# Patient Record
Sex: FEMALE | Race: BLACK OR AFRICAN AMERICAN | NOT HISPANIC OR LATINO | Employment: FULL TIME | URBAN - METROPOLITAN AREA
[De-identification: names, ages, dates, MRNs, and addresses within clinical notes are randomized per-mention and may not be internally consistent; named-entity substitution may affect disease eponyms.]

---

## 2018-12-03 ENCOUNTER — APPOINTMENT (OUTPATIENT)
Dept: RADIOLOGY | Facility: MEDICAL CENTER | Age: 38
End: 2018-12-03
Attending: EMERGENCY MEDICINE
Payer: MEDICAID

## 2018-12-03 ENCOUNTER — HOSPITAL ENCOUNTER (EMERGENCY)
Facility: MEDICAL CENTER | Age: 38
End: 2018-12-03
Attending: EMERGENCY MEDICINE
Payer: MEDICAID

## 2018-12-03 VITALS
BODY MASS INDEX: 33.3 KG/M2 | TEMPERATURE: 98.6 F | WEIGHT: 176.37 LBS | RESPIRATION RATE: 20 BRPM | DIASTOLIC BLOOD PRESSURE: 94 MMHG | SYSTOLIC BLOOD PRESSURE: 137 MMHG | OXYGEN SATURATION: 98 % | HEIGHT: 61 IN | HEART RATE: 85 BPM

## 2018-12-03 DIAGNOSIS — R07.9 ACUTE CHEST PAIN: ICD-10-CM

## 2018-12-03 DIAGNOSIS — S22.000A COMPRESSION FRACTURE OF BODY OF THORACIC VERTEBRA (HCC): ICD-10-CM

## 2018-12-03 DIAGNOSIS — R05.9 COUGH: ICD-10-CM

## 2018-12-03 DIAGNOSIS — M79.89 LEFT LEG SWELLING: ICD-10-CM

## 2018-12-03 LAB
ALBUMIN SERPL BCP-MCNC: 3.8 G/DL (ref 3.2–4.9)
ALBUMIN/GLOB SERPL: 1.2 G/DL
ALP SERPL-CCNC: 73 U/L (ref 30–99)
ALT SERPL-CCNC: 22 U/L (ref 2–50)
ANION GAP SERPL CALC-SCNC: 7 MMOL/L (ref 0–11.9)
AST SERPL-CCNC: 29 U/L (ref 12–45)
BASOPHILS # BLD AUTO: 0.5 % (ref 0–1.8)
BASOPHILS # BLD: 0.03 K/UL (ref 0–0.12)
BILIRUB SERPL-MCNC: 0.6 MG/DL (ref 0.1–1.5)
BUN SERPL-MCNC: <5 MG/DL (ref 8–22)
CALCIUM SERPL-MCNC: 9.3 MG/DL (ref 8.4–10.2)
CHLORIDE SERPL-SCNC: 101 MMOL/L (ref 96–112)
CO2 SERPL-SCNC: 24 MMOL/L (ref 20–33)
CREAT SERPL-MCNC: 0.91 MG/DL (ref 0.5–1.4)
EOSINOPHIL # BLD AUTO: 0.1 K/UL (ref 0–0.51)
EOSINOPHIL NFR BLD: 1.7 % (ref 0–6.9)
ERYTHROCYTE [DISTWIDTH] IN BLOOD BY AUTOMATED COUNT: 41.5 FL (ref 35.9–50)
GLOBULIN SER CALC-MCNC: 3.3 G/DL (ref 1.9–3.5)
GLUCOSE SERPL-MCNC: 106 MG/DL (ref 65–99)
HCG SERPL QL: NEGATIVE
HCT VFR BLD AUTO: 45.7 % (ref 37–47)
HGB BLD-MCNC: 16.1 G/DL (ref 12–16)
IMM GRANULOCYTES # BLD AUTO: 0.01 K/UL (ref 0–0.11)
IMM GRANULOCYTES NFR BLD AUTO: 0.2 % (ref 0–0.9)
LYMPHOCYTES # BLD AUTO: 2.22 K/UL (ref 1–4.8)
LYMPHOCYTES NFR BLD: 38.2 % (ref 22–41)
MCH RBC QN AUTO: 30.3 PG (ref 27–33)
MCHC RBC AUTO-ENTMCNC: 35.2 G/DL (ref 33.6–35)
MCV RBC AUTO: 85.9 FL (ref 81.4–97.8)
MONOCYTES # BLD AUTO: 1 K/UL (ref 0–0.85)
MONOCYTES NFR BLD AUTO: 17.2 % (ref 0–13.4)
NEUTROPHILS # BLD AUTO: 2.45 K/UL (ref 2–7.15)
NEUTROPHILS NFR BLD: 42.2 % (ref 44–72)
NRBC # BLD AUTO: 0 K/UL
NRBC BLD-RTO: 0 /100 WBC
PLATELET # BLD AUTO: 289 K/UL (ref 164–446)
PMV BLD AUTO: 9.7 FL (ref 9–12.9)
POTASSIUM SERPL-SCNC: 3.5 MMOL/L (ref 3.6–5.5)
PROT SERPL-MCNC: 7.1 G/DL (ref 6–8.2)
RBC # BLD AUTO: 5.32 M/UL (ref 4.2–5.4)
SODIUM SERPL-SCNC: 132 MMOL/L (ref 135–145)
TROPONIN I SERPL-MCNC: <0.02 NG/ML (ref 0–0.04)
WBC # BLD AUTO: 5.8 K/UL (ref 4.8–10.8)

## 2018-12-03 PROCEDURE — 71046 X-RAY EXAM CHEST 2 VIEWS: CPT

## 2018-12-03 PROCEDURE — 85025 COMPLETE CBC W/AUTO DIFF WBC: CPT

## 2018-12-03 PROCEDURE — 80053 COMPREHEN METABOLIC PANEL: CPT

## 2018-12-03 PROCEDURE — 700111 HCHG RX REV CODE 636 W/ 250 OVERRIDE (IP): Performed by: EMERGENCY MEDICINE

## 2018-12-03 PROCEDURE — 96374 THER/PROPH/DIAG INJ IV PUSH: CPT

## 2018-12-03 PROCEDURE — 99285 EMERGENCY DEPT VISIT HI MDM: CPT

## 2018-12-03 PROCEDURE — 93970 EXTREMITY STUDY: CPT

## 2018-12-03 PROCEDURE — 84484 ASSAY OF TROPONIN QUANT: CPT

## 2018-12-03 PROCEDURE — A9270 NON-COVERED ITEM OR SERVICE: HCPCS | Performed by: EMERGENCY MEDICINE

## 2018-12-03 PROCEDURE — 700102 HCHG RX REV CODE 250 W/ 637 OVERRIDE(OP): Performed by: EMERGENCY MEDICINE

## 2018-12-03 PROCEDURE — 84703 CHORIONIC GONADOTROPIN ASSAY: CPT

## 2018-12-03 PROCEDURE — 93005 ELECTROCARDIOGRAM TRACING: CPT

## 2018-12-03 PROCEDURE — 93005 ELECTROCARDIOGRAM TRACING: CPT | Performed by: EMERGENCY MEDICINE

## 2018-12-03 RX ORDER — BENZONATATE 100 MG/1
100 CAPSULE ORAL 3 TIMES DAILY PRN
Qty: 30 CAP | Refills: 0 | Status: SHIPPED | OUTPATIENT
Start: 2018-12-03 | End: 2018-12-13

## 2018-12-03 RX ORDER — KETOROLAC TROMETHAMINE 30 MG/ML
15 INJECTION, SOLUTION INTRAMUSCULAR; INTRAVENOUS ONCE
Status: COMPLETED | OUTPATIENT
Start: 2018-12-03 | End: 2018-12-03

## 2018-12-03 RX ORDER — BENZONATATE 100 MG/1
100 CAPSULE ORAL ONCE
Status: COMPLETED | OUTPATIENT
Start: 2018-12-03 | End: 2018-12-03

## 2018-12-03 RX ADMIN — BENZONATATE 100 MG: 100 CAPSULE ORAL at 04:33

## 2018-12-03 RX ADMIN — KETOROLAC TROMETHAMINE 15 MG: 30 INJECTION, SOLUTION INTRAMUSCULAR at 04:33

## 2018-12-03 ASSESSMENT — ENCOUNTER SYMPTOMS
BRUISES/BLEEDS EASILY: 0
BLOOD IN STOOL: 0
SEIZURES: 0
ABDOMINAL PAIN: 0
RESPIRATORY NEGATIVE: 1
HALLUCINATIONS: 0
FLANK PAIN: 0
GASTROINTESTINAL NEGATIVE: 1
CONSTITUTIONAL NEGATIVE: 1
WEAKNESS: 0
SHORTNESS OF BREATH: 0
FOCAL WEAKNESS: 0
FEVER: 0
BACK PAIN: 0
MYALGIAS: 0
EYE PAIN: 0
HEADACHES: 0
EYES NEGATIVE: 1
SORE THROAT: 0
NECK PAIN: 0

## 2018-12-03 NOTE — DISCHARGE INSTRUCTIONS
Today your xray shows: a compression fracture of T8 vertebral body.  Please discuss this with your primary care physician for ongoing management

## 2018-12-03 NOTE — ED NOTES
D/c inst reviewed w/the pt to include supportive tx for cold sx, rx x 1, f/u op and return as needed.
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
## 2018-12-17 ENCOUNTER — OFFICE VISIT (OUTPATIENT)
Dept: MEDICAL GROUP | Facility: MEDICAL CENTER | Age: 38
End: 2018-12-17
Attending: FAMILY MEDICINE
Payer: MEDICAID

## 2018-12-17 VITALS
HEIGHT: 61 IN | WEIGHT: 172 LBS | RESPIRATION RATE: 14 BRPM | TEMPERATURE: 97.8 F | HEART RATE: 98 BPM | OXYGEN SATURATION: 96 % | DIASTOLIC BLOOD PRESSURE: 80 MMHG | SYSTOLIC BLOOD PRESSURE: 115 MMHG | BODY MASS INDEX: 32.47 KG/M2

## 2018-12-17 DIAGNOSIS — F17.200 SMOKER: ICD-10-CM

## 2018-12-17 DIAGNOSIS — M54.6 MIDLINE THORACIC BACK PAIN, UNSPECIFIED CHRONICITY: ICD-10-CM

## 2018-12-17 DIAGNOSIS — S22.000A CLOSED COMPRESSION FRACTURE OF THORACIC VERTEBRA, INITIAL ENCOUNTER (HCC): ICD-10-CM

## 2018-12-17 DIAGNOSIS — F19.11 HISTORY OF SUBSTANCE ABUSE (HCC): ICD-10-CM

## 2018-12-17 DIAGNOSIS — J45.20 MILD INTERMITTENT ASTHMA WITHOUT COMPLICATION: ICD-10-CM

## 2018-12-17 DIAGNOSIS — F31.9 BIPOLAR 1 DISORDER (HCC): ICD-10-CM

## 2018-12-17 PROCEDURE — 99213 OFFICE O/P EST LOW 20 MIN: CPT | Performed by: FAMILY MEDICINE

## 2018-12-17 PROCEDURE — 99214 OFFICE O/P EST MOD 30 MIN: CPT | Performed by: FAMILY MEDICINE

## 2018-12-17 RX ORDER — NICOTINE 21 MG/24HR
1 PATCH, TRANSDERMAL 24 HOURS TRANSDERMAL EVERY 24 HOURS
Qty: 30 PATCH | Refills: 3 | Status: SHIPPED | OUTPATIENT
Start: 2018-12-17 | End: 2020-12-09

## 2018-12-17 RX ORDER — MELOXICAM 15 MG/1
7.5-15 TABLET ORAL DAILY
Qty: 30 TAB | Refills: 3 | Status: SHIPPED | OUTPATIENT
Start: 2018-12-17 | End: 2020-12-09

## 2018-12-17 RX ORDER — ALBUTEROL SULFATE 90 UG/1
2 AEROSOL, METERED RESPIRATORY (INHALATION) EVERY 4 HOURS PRN
Qty: 1 INHALER | Refills: 3 | Status: SHIPPED | OUTPATIENT
Start: 2018-12-17 | End: 2019-05-01 | Stop reason: SDUPTHER

## 2018-12-17 RX ORDER — GABAPENTIN 100 MG/1
100 CAPSULE ORAL 3 TIMES DAILY
Qty: 90 CAP | Refills: 3 | Status: SHIPPED | OUTPATIENT
Start: 2018-12-17 | End: 2019-01-21

## 2018-12-17 ASSESSMENT — ENCOUNTER SYMPTOMS
TINGLING: 1
NECK PAIN: 0
HEADACHES: 0
ABDOMINAL PAIN: 0
PALPITATIONS: 0
NERVOUS/ANXIOUS: 1
EYES NEGATIVE: 1
VOMITING: 0
SPEECH CHANGE: 0
HALLUCINATIONS: 0
CHILLS: 0
NAUSEA: 0
COUGH: 0
TREMORS: 0
FOCAL WEAKNESS: 1
FEVER: 0
SEIZURES: 0
BACK PAIN: 1
SENSORY CHANGE: 0
SHORTNESS OF BREATH: 0
DEPRESSION: 1
INSOMNIA: 0
SPUTUM PRODUCTION: 0

## 2018-12-17 ASSESSMENT — LIFESTYLE VARIABLES: SUBSTANCE_ABUSE: 1

## 2018-12-24 ENCOUNTER — TELEPHONE (OUTPATIENT)
Dept: MEDICAL GROUP | Facility: MEDICAL CENTER | Age: 38
End: 2018-12-24

## 2018-12-24 NOTE — TELEPHONE ENCOUNTER
She will need to see neurosurgery as ordered. Minimal disc bulge in the area of the compression fracture. Will continue to follow. ER precaution had been given to pt.    Thanks

## 2018-12-24 NOTE — TELEPHONE ENCOUNTER
1. Caller Name: Julia Choi                                           Call Back Number: [phone] (home)         Patient approves a detailed voicemail message: N\A    Patient is requesting results from mri. Please advise.

## 2019-01-16 ENCOUNTER — APPOINTMENT (OUTPATIENT)
Dept: PHYSICAL THERAPY | Facility: REHABILITATION | Age: 39
End: 2019-01-16
Payer: MEDICAID

## 2019-01-21 ENCOUNTER — OFFICE VISIT (OUTPATIENT)
Dept: MEDICAL GROUP | Facility: MEDICAL CENTER | Age: 39
End: 2019-01-21
Attending: FAMILY MEDICINE
Payer: MEDICAID

## 2019-01-21 VITALS
BODY MASS INDEX: 32.1 KG/M2 | WEIGHT: 170 LBS | SYSTOLIC BLOOD PRESSURE: 115 MMHG | RESPIRATION RATE: 14 BRPM | DIASTOLIC BLOOD PRESSURE: 70 MMHG | HEIGHT: 61 IN | OXYGEN SATURATION: 96 % | HEART RATE: 84 BPM | TEMPERATURE: 97.3 F

## 2019-01-21 DIAGNOSIS — G89.4 CHRONIC PAIN SYNDROME: ICD-10-CM

## 2019-01-21 DIAGNOSIS — S22.000G CLOSED COMPRESSION FRACTURE OF THORACIC VERTEBRA WITH DELAYED HEALING, SUBSEQUENT ENCOUNTER: ICD-10-CM

## 2019-01-21 PROCEDURE — 99214 OFFICE O/P EST MOD 30 MIN: CPT | Performed by: FAMILY MEDICINE

## 2019-01-21 PROCEDURE — 99212 OFFICE O/P EST SF 10 MIN: CPT | Performed by: FAMILY MEDICINE

## 2019-01-21 RX ORDER — GABAPENTIN 300 MG/1
300 CAPSULE ORAL 3 TIMES DAILY
Qty: 90 CAP | Refills: 3 | Status: SHIPPED | OUTPATIENT
Start: 2019-01-21 | End: 2019-05-01 | Stop reason: SDUPTHER

## 2019-01-21 RX ORDER — TIZANIDINE 4 MG/1
4 TABLET ORAL EVERY 6 HOURS PRN
Qty: 30 TAB | Refills: 3 | Status: SHIPPED | OUTPATIENT
Start: 2019-01-21 | End: 2019-01-21 | Stop reason: SDUPTHER

## 2019-01-21 RX ORDER — TIZANIDINE 4 MG/1
2-4 TABLET ORAL EVERY 6 HOURS PRN
Qty: 30 TAB | Refills: 3 | Status: SHIPPED | OUTPATIENT
Start: 2019-01-21 | End: 2020-12-09

## 2019-01-21 ASSESSMENT — ENCOUNTER SYMPTOMS
SPEECH CHANGE: 0
BACK PAIN: 1
ABDOMINAL PAIN: 0
HALLUCINATIONS: 0
FEVER: 0
SENSORY CHANGE: 0
VOMITING: 0
NAUSEA: 0
CHILLS: 0
NECK PAIN: 1
HEADACHES: 0
SHORTNESS OF BREATH: 0
PALPITATIONS: 0
DEPRESSION: 0
FOCAL WEAKNESS: 0
NERVOUS/ANXIOUS: 1
TREMORS: 0
TINGLING: 1
SPUTUM PRODUCTION: 0
COUGH: 0

## 2019-01-21 ASSESSMENT — LIFESTYLE VARIABLES: SUBSTANCE_ABUSE: 0

## 2019-01-21 NOTE — PROGRESS NOTES
Subjective:      Julia Choi is a 38 y.o. female who presents with Follow-Up (referrals)            Patient 38-year-old female here for follow-up of her chronic thoracic pain.    She states that she forgot to get her x-ray exams on her MRI done.  She will be getting it done prior to her next appointment.  The order forms have been reprinted for patient to have copies of the test she needs to have done.  She is also waiting for the neurosurgeon and pain specialist to call her back.  She states that she has tried to call their office but did not get a response when she tried to call.  We will reprint the information to the specialist she should be contacting.  We will continue to follow.  Her Neurontin has been increased from 100 mg 3 times daily to 300 mg 3 times daily.  We will have her continue using her Mobic as directed.  We will also have her try Zanaflex 2-4 mg as needed.  And we will also have her contact physical therapy to start therapy for her chronic back pain.  We will continue to follow.     Current medications, allergies, and problem list reviewed with patient and updated in EPIC.          Review of Systems   Constitutional: Negative for chills and fever.   HENT: Negative for hearing loss and tinnitus.    Respiratory: Negative for cough, sputum production and shortness of breath.    Cardiovascular: Negative for chest pain and palpitations.   Gastrointestinal: Negative for abdominal pain, nausea and vomiting.   Musculoskeletal: Positive for back pain and neck pain. Negative for joint pain.   Skin: Negative for rash.   Neurological: Positive for tingling. Negative for tremors, sensory change, speech change, focal weakness and headaches.   Psychiatric/Behavioral: Negative for depression, hallucinations, substance abuse and suicidal ideas. The patient is nervous/anxious.           Objective:     /70 (BP Location: Right arm, Patient Position: Sitting)   Pulse 84   Temp 36.3 °C (97.3 °F)    "Resp 14   Ht 1.549 m (5' 1\")   Wt 77.1 kg (170 lb)   SpO2 96%   BMI 32.12 kg/m²      Physical Exam   Constitutional: She is oriented to person, place, and time.   BMI 32   HENT:   Head: Normocephalic and atraumatic.   Cardiovascular: Normal rate, regular rhythm and normal heart sounds.  Exam reveals no friction rub.    No murmur heard.  Pulmonary/Chest: Effort normal and breath sounds normal. No respiratory distress. She has no wheezes. She has no rales.   Abdominal: Soft. Bowel sounds are normal. She exhibits no distension. There is no tenderness.   Musculoskeletal: She exhibits tenderness. She exhibits no edema.   Over thoracic spine   Neurological: She is alert and oriented to person, place, and time.   Skin: Skin is warm and dry.   Psychiatric: She has a normal mood and affect. Her behavior is normal.   Nursing note and vitals reviewed.              Assessment/Plan:     1. Closed compression fracture of thoracic vertebra with delayed healing, subsequent encounter  We will have her get her x-rays done of her back.  She will also contact neurosurgery and pain management to schedule appointments for further evaluation.  In the meantime will have her increase her Neurontin to 300 mg 3 times daily, will have her continue Mobic as directed and also try Zanaflex 2-4 mg 3 times a day as needed.  Physical therapy has also been ordered for patient.  We will continue to follow.  - tizanidine (ZANAFLEX) 2-4 MG Tab; Take 0.5-1 Tabs by mouth every 6 hours as needed.  Dispense: 30 Tab; Refill: 3    2. Chronic pain syndrome  See above plan.  - tizanidine (ZANAFLEX) 2-4 MG Tab; Take 0.5-1 Tabs by mouth every 6 hours as needed.  Dispense: 30 Tab; Refill: 3        "

## 2019-01-22 ENCOUNTER — APPOINTMENT (OUTPATIENT)
Dept: PHYSICAL THERAPY | Facility: REHABILITATION | Age: 39
End: 2019-01-22
Payer: MEDICAID

## 2019-01-25 ENCOUNTER — APPOINTMENT (OUTPATIENT)
Dept: PHYSICAL THERAPY | Facility: REHABILITATION | Age: 39
End: 2019-01-25
Payer: MEDICAID

## 2019-01-29 ENCOUNTER — APPOINTMENT (OUTPATIENT)
Dept: PHYSICAL THERAPY | Facility: REHABILITATION | Age: 39
End: 2019-01-29
Payer: MEDICAID

## 2019-01-31 LAB — EKG IMPRESSION: NORMAL

## 2019-02-01 ENCOUNTER — APPOINTMENT (OUTPATIENT)
Dept: PHYSICAL THERAPY | Facility: REHABILITATION | Age: 39
End: 2019-02-01
Payer: MEDICAID

## 2019-05-01 RX ORDER — ALBUTEROL SULFATE 90 UG/1
2 AEROSOL, METERED RESPIRATORY (INHALATION) EVERY 4 HOURS PRN
Qty: 1 INHALER | Refills: 0 | Status: SHIPPED | OUTPATIENT
Start: 2019-05-01 | End: 2019-05-24 | Stop reason: SDUPTHER

## 2019-05-01 RX ORDER — GABAPENTIN 300 MG/1
300 CAPSULE ORAL 3 TIMES DAILY
Qty: 90 CAP | Refills: 0 | Status: SHIPPED | OUTPATIENT
Start: 2019-05-01 | End: 2020-12-09

## 2019-05-24 RX ORDER — ALBUTEROL SULFATE 90 UG/1
2 AEROSOL, METERED RESPIRATORY (INHALATION) EVERY 4 HOURS PRN
Qty: 8.5 INHALER | Refills: 1 | Status: SHIPPED | OUTPATIENT
Start: 2019-05-24 | End: 2019-07-30 | Stop reason: SDUPTHER

## 2019-07-30 RX ORDER — ALBUTEROL SULFATE 90 UG/1
2 AEROSOL, METERED RESPIRATORY (INHALATION) EVERY 4 HOURS PRN
Qty: 8.5 INHALER | Refills: 1 | Status: SHIPPED | OUTPATIENT
Start: 2019-07-30 | End: 2020-12-09

## 2019-09-09 ENCOUNTER — HOSPITAL ENCOUNTER (EMERGENCY)
Facility: MEDICAL CENTER | Age: 39
End: 2019-09-10
Attending: EMERGENCY MEDICINE
Payer: MEDICAID

## 2019-09-09 VITALS
HEART RATE: 97 BPM | HEIGHT: 61 IN | SYSTOLIC BLOOD PRESSURE: 117 MMHG | RESPIRATION RATE: 19 BRPM | DIASTOLIC BLOOD PRESSURE: 75 MMHG | OXYGEN SATURATION: 96 % | TEMPERATURE: 98.1 F | WEIGHT: 175.27 LBS | BODY MASS INDEX: 33.09 KG/M2

## 2019-09-09 DIAGNOSIS — Z20.2 EXPOSURE TO SYPHILIS: ICD-10-CM

## 2019-09-09 PROCEDURE — 99284 EMERGENCY DEPT VISIT MOD MDM: CPT

## 2019-09-09 PROCEDURE — 96372 THER/PROPH/DIAG INJ SC/IM: CPT

## 2019-09-09 PROCEDURE — 86780 TREPONEMA PALLIDUM: CPT

## 2019-09-09 PROCEDURE — 700111 HCHG RX REV CODE 636 W/ 250 OVERRIDE (IP): Performed by: EMERGENCY MEDICINE

## 2019-09-09 RX ADMIN — PENICILLIN G BENZATHINE 2.4 MILLION UNITS: 1200000 INJECTION, SUSPENSION INTRAMUSCULAR at 23:51

## 2019-09-09 ASSESSMENT — ENCOUNTER SYMPTOMS
FLANK PAIN: 0
SHORTNESS OF BREATH: 0
FEVER: 0
COUGH: 0
NECK PAIN: 0
CHILLS: 0

## 2019-09-10 LAB — TREPONEMA PALLIDUM IGG+IGM AB [PRESENCE] IN SERUM OR PLASMA BY IMMUNOASSAY: NON REACTIVE
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
## 2019-09-10 NOTE — ED NOTES
PT tolerated IM injection poorly. PT given DC instructions and states understanding. Denies need for assistance. Stable at DC. NAD noted. Ambulatory with ease.

## 2019-09-10 NOTE — ED NOTES
Pt informed of estimated wait times and made aware no wait time is guaranteed. Educated on triage and care process.  Asked to return to RN for any new or worsening of symptoms. Thanked for patience. Pt aware of risks of leaving ama. Pelvic cart set up and in room
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
## 2020-12-09 ENCOUNTER — OFFICE VISIT (OUTPATIENT)
Dept: MEDICAL GROUP | Facility: MEDICAL CENTER | Age: 40
End: 2020-12-09
Attending: FAMILY MEDICINE
Payer: COMMERCIAL

## 2020-12-09 ENCOUNTER — TELEPHONE (OUTPATIENT)
Dept: SCHEDULING | Facility: IMAGING CENTER | Age: 40
End: 2020-12-09

## 2020-12-09 VITALS
HEIGHT: 61 IN | DIASTOLIC BLOOD PRESSURE: 80 MMHG | TEMPERATURE: 97.8 F | OXYGEN SATURATION: 98 % | HEART RATE: 83 BPM | RESPIRATION RATE: 16 BRPM | SYSTOLIC BLOOD PRESSURE: 118 MMHG | BODY MASS INDEX: 33.61 KG/M2 | WEIGHT: 178 LBS

## 2020-12-09 DIAGNOSIS — Z12.31 ENCOUNTER FOR SCREENING MAMMOGRAM FOR MALIGNANT NEOPLASM OF BREAST: ICD-10-CM

## 2020-12-09 DIAGNOSIS — F31.9 BIPOLAR 1 DISORDER (HCC): ICD-10-CM

## 2020-12-09 DIAGNOSIS — Z00.00 HEALTHCARE MAINTENANCE: ICD-10-CM

## 2020-12-09 DIAGNOSIS — M25.552 LEFT HIP PAIN: ICD-10-CM

## 2020-12-09 DIAGNOSIS — Z97.5 IUD (INTRAUTERINE DEVICE) IN PLACE: ICD-10-CM

## 2020-12-09 DIAGNOSIS — Z11.3 ROUTINE SCREENING FOR STI (SEXUALLY TRANSMITTED INFECTION): ICD-10-CM

## 2020-12-09 DIAGNOSIS — Z80.0 FAMILY HISTORY OF COLON CANCER: ICD-10-CM

## 2020-12-09 PROCEDURE — 99214 OFFICE O/P EST MOD 30 MIN: CPT | Performed by: FAMILY MEDICINE

## 2020-12-09 PROCEDURE — 99213 OFFICE O/P EST LOW 20 MIN: CPT | Performed by: FAMILY MEDICINE

## 2020-12-09 RX ORDER — DIVALPROEX SODIUM 250 MG/1
250 TABLET, DELAYED RELEASE ORAL 2 TIMES DAILY
Qty: 60 TAB | Refills: 2 | Status: SHIPPED | OUTPATIENT
Start: 2020-12-09 | End: 2020-12-09

## 2020-12-09 RX ORDER — PSEUDOEPHED/ACETAMINOPH/DIPHEN 30MG-500MG
TABLET ORAL
COMMUNITY
Start: 2020-11-18

## 2020-12-09 RX ORDER — IBUPROFEN 600 MG/1
TABLET ORAL
COMMUNITY
Start: 2020-11-18 | End: 2020-12-09

## 2020-12-09 RX ORDER — DIVALPROEX SODIUM 250 MG/1
250 TABLET, DELAYED RELEASE ORAL 2 TIMES DAILY
Qty: 60 TAB | Refills: 2 | Status: SHIPPED | OUTPATIENT
Start: 2020-12-09 | End: 2021-01-12

## 2020-12-09 SDOH — HEALTH STABILITY: MENTAL HEALTH: HOW MANY STANDARD DRINKS CONTAINING ALCOHOL DO YOU HAVE ON A TYPICAL DAY?: 1 OR 2

## 2020-12-09 SDOH — HEALTH STABILITY: MENTAL HEALTH: HOW OFTEN DO YOU HAVE A DRINK CONTAINING ALCOHOL?: 2-4 TIMES A MONTH

## 2020-12-09 NOTE — LETTER
Critical [facility] hospital  Jocelyne Emery M.D.  [address]  [place] [address]  Fax: [phone]   Authorization for Release/Disclosure of   Protected Health Information   Name: JULIA CHOI : 1980 SSN: [id number]   Address: [address] [place] [address] [address]  [place] [address] Phone:    [phone] (home)    I authorize the entity listed below to release/disclose the PHI below to:   [facility]/Jocelyne Emery M.D. and Jocelyne Emery M.D.   Provider or Entity Name:  [facility]    Address   City, [facility], [facility]   Phone:  [phone]    Fax:     Reason for request: continuity of care   Information to be released:    [  ] LAST COLONOSCOPY,  including any PATH REPORT and follow-up  [  ] LAST FIT/COLOGUARD RESULT [  ] LAST DEXA  [  ] LAST MAMMOGRAM  [  ] LAST PAP  [  ] LAST LABS [  ] RETINA EXAM REPORT  [  ] IMMUNIZATION RECORDS  [X  ] Release all info      [  ] Check here and initial the line next to each item to release ALL health information INCLUDING  _____ Care and treatment for drug and / or alcohol abuse  _____ HIV testing, infection status, or AIDS  _____ Genetic Testing    DATES OF SERVICE OR TIME PERIOD TO BE DISCLOSED: _____________  I understand and acknowledge that:  * This Authorization may be revoked at any time by you in writing, except if your health information has already been used or disclosed.  * Your health information that will be used or disclosed as a result of you signing this authorization could be re-disclosed by the recipient. If this occurs, your re-disclosed health information may no longer be protected by State or Federal laws.  * You may refuse to sign this Authorization. Your refusal will not affect your ability to obtain treatment.  * This Authorization becomes effective upon signing and will  on (date) __________.      If no date is indicated, this Authorization will  one (1) year from the signature date.    Name: Julia Choi    Signature:   Date:     2020          PLEASE FAX REQUESTED RECORDS BACK TO: [phone]

## 2020-12-10 NOTE — ASSESSMENT & PLAN NOTE
"Ongoing x 1 year, worse over the last 2 months  L groin pain  Mom had a hip replacement   Walking with a limp  Limits activity  Works at [employer] and is on her feet and is painful  Pain is worse with activity, worse with \"lifting\" the leg, stairs  Pain is improved with motrin and tylenol but not much  Motrin - 600mg once daily    "

## 2020-12-10 NOTE — PROGRESS NOTES
"Subjective:     CC: Establish care, left hip pain, physical    HPI:     Left hip pain  Ongoing x 1 year, worse over the last 2 months  L groin pain  Mom had a hip replacement   Walking with a limp  Limits activity  Works at [employer] and is on her feet and is painful  Pain is worse with activity, worse with \"lifting\" the leg, stairs  Pain is improved with motrin and tylenol but not much  Motrin - 600mg once daily      Bipolar 1 disorder (HCC)  Diagnosed in 2016  Was on seroquel but didn't like it  Manic episodes - \"I have them all the time\" - can't sleep, awake for 2 days and won't be tired. Gets emotional and depressed. Will have suicidal thoughts. \"my brain is just going and going\". \"I think i'm super woman\" and then will crash and be a self-deprecating thoughts     Previously was getting care at [facility] in 2018.  Last Pap was about 2 to 3 years ago    Past Medical History:   Diagnosis Date   • Allergy     Abx that starts w/\"D\"   • Compression fracture of body of thoracic vertebra (HCC)    • Depression Dx 20 yo    no meds   • Head injury        Social History     Tobacco Use   • Smoking status: Current Every Day Smoker     Packs/day: 0.50     Years: 15.00     Pack years: 7.50     Types: Cigarettes   • Smokeless tobacco: Never Used   • Tobacco comment: now 1/4 pack   Substance Use Topics   • Alcohol use: Yes     Alcohol/week: 0.0 oz     Frequency: 2-4 times a month     Drinks per session: 1 or 2     Comment: stopped 6 months ago, was in [employer] program   • Drug use: Not Currently     Types: Cocaine     Comment: smoked cocaine 3-4 years ago       Current Outpatient Medications Ordered in Epic   Medication Sig Dispense Refill   • divalproex (DEPAKOTE) 250 MG Tablet Delayed Response Take 1 Tab by mouth 2 Times a Day. 60 Tab 2   • ACETAMINOPHEN EXTRA STRENGTH 500 MG Tab TAKE 1 2 TABLETS BY MOUTH EVERY 6 HOURS AS NEEDED FOR PAIN OR FEVER (MAX 6 TABS IN 24 HOURS)       No current Epic-ordered " "facility-administered medications on file.        Allergies:  Doxycycline and Nkda [no known drug allergy]      ROS:  Gen: no fevers/chills  Eyes: (+) glasses  ENT: no sore throat  Pulm: no sob, no cough  CV: no chest pain  Psych: (+) brigette      Objective:       Exam:  /80 (BP Location: Right arm, Patient Position: Sitting, BP Cuff Size: Adult)   Pulse 83   Temp 36.6 °C (97.8 °F) (Temporal)   Resp 16   Ht 1.549 m (5' 1\")   Wt 80.7 kg (178 lb)   SpO2 98%   BMI 33.63 kg/m²  Body mass index is 33.63 kg/m².    General: Normal appearing. No distress.  Head: normocephalic  Eyes:  Eyes conjunctiva clear lids without ptosis, pupils equal and reactive to light accommodation  ENT: Ears normal shape and contour, canals are clear bilaterally, tympanic membranes are benign   Neck: Supple. Thyroid is not enlarged.  Pulmonary: Clear to ausculation.  Normal effort. No rales, ronchi, or wheezing.  Cardiovascular: Regular rate and rhythm without murmur. Radial pulses are intact and equal bilaterally.  Abdomen: Soft, nontender, nondistended. Normal bowel sounds.  Neurologic: no facial droop, gait normal  Lymph: No cervical or supraclavicular lymph nodes are palpable  Skin: Warm and dry.  No obvious lesions.  Musculoskeletal:  No extremity cyanosis, clubbing, or edema.  Antalgic gait.  Limited range of motion at the left hip due to pain.  When asked to flex at the hip while on her back, she can barely bring it up and was screaming in pain, however then sat up immediately to hold her hip without limitation.  Could not perform ANUEL, FADIR due to pain, would not allow me to manipulate the hip.  She does describe pain at the left groin  Psych: Normal mood and affect. Alert and oriented x3. Judgment and insight is normal.      Labs:   Labs and imaging reviewed over the last 2 years, noncontributory    Assessment & Plan:     40 y.o. female with the following -     1. Left hip pain  - DX-HIP-BILATERAL-WITH PELVIS-3/4 VIEWS; " Future  Significant pain at the left groin, however her exam was discongruent, could not flex her leg while lying back, however could sit up from lying down easily.  Advised patient to continue taking ibuprofen 600 mg with Tylenol for pain relief, obtain hip x-rays soon as possible.  I suspect some kind of hip joint abnormality, will likely need an orthopedic referral.    2. Bipolar 1 disorder (HCC)  - divalproex (DEPAKOTE) 250 MG Tablet Delayed Response; Take 1 Tab by mouth 2 Times a Day.  Dispense: 60 Tab; Refill: 2  - VALPROIC ACID; Future  She declined Seroquel or anything that could make her drowsy, she did not like it when she took it before.  We will start her on Depakote 500 mg daily.  I have given her a [facility] mental [facility] card for her to establish care for therapy and for psychiatric medication management treatment.    3. Healthcare maintenance  - CBC WITH DIFFERENTIAL; Future  - Comp Metabolic Panel; Future  - Lipid Profile; Future  - HEMOGLOBIN A1C; Future    4. Family history of colon cancer  - REFERRAL TO GI FOR COLONOSCOPY  Given that her father and grandfather had possibly colon cancer at a young age, she would be a candidate for earlier colonoscopy colon cancer screening.    5. IUD (intrauterine device) in place  - REFERRAL TO GYNECOLOGY  Due for replacement in March 2021, per patient    6. Encounter for screening mammogram for malignant neoplasm of breast  - MA-SCREENING MAMMO BILAT W/CAD; Future      Return in about 1 month (around 1/9/2021).   Wants to discuss her right hand numbness at next visit  [facility]    Please note that this dictation was created using voice recognition software. I have made every reasonable attempt to correct obvious errors, but I expect that there are errors of grammar and possibly content that I did not discover before finalizing the note.

## 2020-12-10 NOTE — PATIENT INSTRUCTIONS
1. Referrals:  - Gastroenterology for colonoscopy (colon cancer screening)  - Gynecology for your IUD  - Psychiatry for bipolar disorder    2. Get labs (fasting) done 1 week after you start the depakote    3. Get xray of your hip    4. Pay attention to your hand numbness

## 2020-12-10 NOTE — ASSESSMENT & PLAN NOTE
"Diagnosed in 2016  Was on seroquel but didn't like it  Manic episodes - \"I have them all the time\" - can't sleep, awake for 2 days and won't be tired. Gets emotional and depressed. Will have suicidal thoughts. \"my brain is just going and going\". \"I think i'm super woman\" and then will crash and be a self-deprecating thoughts   "

## 2020-12-18 ENCOUNTER — TELEPHONE (OUTPATIENT)
Dept: MEDICAL GROUP | Facility: MEDICAL CENTER | Age: 40
End: 2020-12-18

## 2020-12-19 NOTE — TELEPHONE ENCOUNTER
VOICEMAIL  1. Caller Name:Julia Choi                       Call Back Number: [phone] (home)       2. Message: Patient left voicemail asking for a clearance letter for her to go back to work.    3. Patient approves office to leave a detailed voicemail/MyChart message: yes

## 2020-12-21 ENCOUNTER — TELEMEDICINE (OUTPATIENT)
Dept: MEDICAL GROUP | Facility: MEDICAL CENTER | Age: 40
End: 2020-12-21
Attending: FAMILY MEDICINE
Payer: COMMERCIAL

## 2020-12-21 DIAGNOSIS — M25.552 LEFT HIP PAIN: ICD-10-CM

## 2020-12-21 PROCEDURE — 99213 OFFICE O/P EST LOW 20 MIN: CPT | Mod: CR | Performed by: FAMILY MEDICINE

## 2020-12-21 NOTE — PROGRESS NOTES
Telephone Appointment Visit   As a means of avoiding spread of COVID-19, this visit is being conducted by telephone. This telephone visit was initiated by the patient and they verbally consented.    Time at start of call: 11:23    Reason for Call:  need a note for work    HPI:    Pt reports that her hip pain has been worsening and has been having to take time off of work. 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 She states that ambulation and standing make her symptoms worse. Her xray is scheduled for 12/30.      Labs / Images Reviewed:   Past Labs/images reviewed, noncontributory    Assessment and Plan:     1. Left hip pain  Note provided for patient to  requesting nonambulatory tasks or decreasing time on her feet. Pt counseled to continue taking NSAIDs/tylenol for pain. Also instructed to complete her labs and imaging orders.    Follow-up: as scheduled    Time at end of call: 11:32  Total Time Spent: 5-10 minutes    Jocelyne Emery M.D.

## 2020-12-21 NOTE — LETTER
December 21, 2020    To Whom It May Concern:         Julia Choi is under my medical care. Her condition is exacerbated by walking and being on her feet, so she should have her responsibilities adjusted to nonambulatory tasks as much as possible or to have her time spent on her feet reduced by at least half while she is undergoing evaluation.         If you have any questions please do not hesitate to call me at the phone number listed below.    Sincerely,          Jocelyne Emery M.D.  [phone]

## 2021-01-11 ENCOUNTER — HOSPITAL ENCOUNTER (OUTPATIENT)
Dept: LAB | Facility: MEDICAL CENTER | Age: 41
End: 2021-01-11
Attending: FAMILY MEDICINE
Payer: COMMERCIAL

## 2021-01-11 ENCOUNTER — HOSPITAL ENCOUNTER (OUTPATIENT)
Dept: RADIOLOGY | Facility: MEDICAL CENTER | Age: 41
End: 2021-01-11
Attending: FAMILY MEDICINE
Payer: COMMERCIAL

## 2021-01-11 DIAGNOSIS — M25.552 LEFT HIP PAIN: ICD-10-CM

## 2021-01-11 DIAGNOSIS — Z00.00 HEALTHCARE MAINTENANCE: ICD-10-CM

## 2021-01-11 DIAGNOSIS — F31.9 BIPOLAR 1 DISORDER (HCC): ICD-10-CM

## 2021-01-11 DIAGNOSIS — Z11.3 ROUTINE SCREENING FOR STI (SEXUALLY TRANSMITTED INFECTION): ICD-10-CM

## 2021-01-11 LAB
ALBUMIN SERPL BCP-MCNC: 4.2 G/DL (ref 3.2–4.9)
ALBUMIN/GLOB SERPL: 1.4 G/DL
ALP SERPL-CCNC: 87 U/L (ref 30–99)
ALT SERPL-CCNC: 21 U/L (ref 2–50)
ANION GAP SERPL CALC-SCNC: 9 MMOL/L (ref 7–16)
AST SERPL-CCNC: 19 U/L (ref 12–45)
BASOPHILS # BLD AUTO: 0.4 % (ref 0–1.8)
BASOPHILS # BLD: 0.02 K/UL (ref 0–0.12)
BILIRUB SERPL-MCNC: <0.2 MG/DL (ref 0.1–1.5)
BUN SERPL-MCNC: 12 MG/DL (ref 8–22)
CALCIUM SERPL-MCNC: 9.5 MG/DL (ref 8.5–10.5)
CHLORIDE SERPL-SCNC: 105 MMOL/L (ref 96–112)
CHOLEST SERPL-MCNC: 170 MG/DL (ref 100–199)
CO2 SERPL-SCNC: 26 MMOL/L (ref 20–33)
CREAT SERPL-MCNC: 0.68 MG/DL (ref 0.5–1.4)
EOSINOPHIL # BLD AUTO: 0.04 K/UL (ref 0–0.51)
EOSINOPHIL NFR BLD: 0.7 % (ref 0–6.9)
ERYTHROCYTE [DISTWIDTH] IN BLOOD BY AUTOMATED COUNT: 44.2 FL (ref 35.9–50)
EST. AVERAGE GLUCOSE BLD GHB EST-MCNC: 111 MG/DL
FASTING STATUS PATIENT QL REPORTED: NORMAL
GLOBULIN SER CALC-MCNC: 3 G/DL (ref 1.9–3.5)
GLUCOSE SERPL-MCNC: 102 MG/DL (ref 65–99)
HBA1C MFR BLD: 5.5 % (ref 0–5.6)
HCT VFR BLD AUTO: 44.6 % (ref 37–47)
HDLC SERPL-MCNC: 58 MG/DL
HGB BLD-MCNC: 14.4 G/DL (ref 12–16)
IMM GRANULOCYTES # BLD AUTO: 0.01 K/UL (ref 0–0.11)
IMM GRANULOCYTES NFR BLD AUTO: 0.2 % (ref 0–0.9)
LDLC SERPL CALC-MCNC: 90 MG/DL
LYMPHOCYTES # BLD AUTO: 2.15 K/UL (ref 1–4.8)
LYMPHOCYTES NFR BLD: 39.1 % (ref 22–41)
MCH RBC QN AUTO: 28.7 PG (ref 27–33)
MCHC RBC AUTO-ENTMCNC: 32.3 G/DL (ref 33.6–35)
MCV RBC AUTO: 88.8 FL (ref 81.4–97.8)
MONOCYTES # BLD AUTO: 0.46 K/UL (ref 0–0.85)
MONOCYTES NFR BLD AUTO: 8.4 % (ref 0–13.4)
NEUTROPHILS # BLD AUTO: 2.82 K/UL (ref 2–7.15)
NEUTROPHILS NFR BLD: 51.2 % (ref 44–72)
NRBC # BLD AUTO: 0 K/UL
NRBC BLD-RTO: 0 /100 WBC
PLATELET # BLD AUTO: 317 K/UL (ref 164–446)
PMV BLD AUTO: 10.5 FL (ref 9–12.9)
POTASSIUM SERPL-SCNC: 4.2 MMOL/L (ref 3.6–5.5)
PROT SERPL-MCNC: 7.2 G/DL (ref 6–8.2)
RBC # BLD AUTO: 5.02 M/UL (ref 4.2–5.4)
SODIUM SERPL-SCNC: 140 MMOL/L (ref 135–145)
TRIGL SERPL-MCNC: 112 MG/DL (ref 0–149)
VALPROATE SERPL-MCNC: <2.8 UG/ML (ref 50–100)
WBC # BLD AUTO: 5.5 K/UL (ref 4.8–10.8)

## 2021-01-11 PROCEDURE — 87491 CHLMYD TRACH DNA AMP PROBE: CPT

## 2021-01-11 PROCEDURE — 87591 N.GONORRHOEAE DNA AMP PROB: CPT

## 2021-01-11 PROCEDURE — 80061 LIPID PANEL: CPT

## 2021-01-11 PROCEDURE — 80053 COMPREHEN METABOLIC PANEL: CPT

## 2021-01-11 PROCEDURE — 73521 X-RAY EXAM HIPS BI 2 VIEWS: CPT

## 2021-01-11 PROCEDURE — 80164 ASSAY DIPROPYLACETIC ACD TOT: CPT

## 2021-01-11 PROCEDURE — 36415 COLL VENOUS BLD VENIPUNCTURE: CPT

## 2021-01-11 PROCEDURE — 83036 HEMOGLOBIN GLYCOSYLATED A1C: CPT

## 2021-01-11 PROCEDURE — 85025 COMPLETE CBC W/AUTO DIFF WBC: CPT

## 2021-01-12 ENCOUNTER — OFFICE VISIT (OUTPATIENT)
Dept: MEDICAL GROUP | Facility: MEDICAL CENTER | Age: 41
End: 2021-01-12
Attending: FAMILY MEDICINE
Payer: COMMERCIAL

## 2021-01-12 VITALS
OXYGEN SATURATION: 96 % | BODY MASS INDEX: 33.93 KG/M2 | HEIGHT: 61 IN | DIASTOLIC BLOOD PRESSURE: 68 MMHG | WEIGHT: 179.7 LBS | TEMPERATURE: 98.6 F | HEART RATE: 113 BPM | RESPIRATION RATE: 16 BRPM | SYSTOLIC BLOOD PRESSURE: 104 MMHG

## 2021-01-12 DIAGNOSIS — M25.552 LEFT HIP PAIN: ICD-10-CM

## 2021-01-12 DIAGNOSIS — F31.9 BIPOLAR 1 DISORDER (HCC): ICD-10-CM

## 2021-01-12 LAB
C TRACH DNA SPEC QL NAA+PROBE: NEGATIVE
N GONORRHOEA DNA SPEC QL NAA+PROBE: POSITIVE
SPECIMEN SOURCE: ABNORMAL

## 2021-01-12 PROCEDURE — 99212 OFFICE O/P EST SF 10 MIN: CPT | Performed by: FAMILY MEDICINE

## 2021-01-12 PROCEDURE — 99214 OFFICE O/P EST MOD 30 MIN: CPT | Performed by: FAMILY MEDICINE

## 2021-01-12 RX ORDER — IBUPROFEN 600 MG/1
600 TABLET ORAL EVERY 8 HOURS PRN
Qty: 30 TAB | Refills: 0 | Status: SHIPPED | OUTPATIENT
Start: 2021-01-12

## 2021-01-12 RX ORDER — DIVALPROEX SODIUM 250 MG/1
TABLET, DELAYED RELEASE ORAL
Qty: 120 TAB | Refills: 1 | Status: SHIPPED | OUTPATIENT
Start: 2021-01-12

## 2021-01-12 RX ORDER — TRAMADOL HYDROCHLORIDE 50 MG/1
50 TABLET ORAL EVERY 8 HOURS PRN
Qty: 42 TAB | Refills: 0 | Status: SHIPPED | OUTPATIENT
Start: 2021-01-12 | End: 2021-01-12

## 2021-01-12 SDOH — HEALTH STABILITY: MENTAL HEALTH: HOW OFTEN DO YOU HAVE A DRINK CONTAINING ALCOHOL?: MONTHLY OR LESS

## 2021-01-12 ASSESSMENT — FIBROSIS 4 INDEX: FIB4 SCORE: 0.52

## 2021-01-12 NOTE — ASSESSMENT & PLAN NOTE
L hip pain is ongoing. Ibuprofen is not helping much. Tylenol doesn't help. Affecting her ability to work  Analgesia:  Activity Level:  Adverse Effects:  Aberrant Behavior: none  Affect and Mood: normal    History of pain:  PHQ9:  Prior imaging: xrays L hip  Prior treatments: NSAIds, tylenol, cannot take muscle relaxants  Response to prior treatments: minimal    Any CS last dose: none  Last dose of currently prescribed meds: n/a  Reports no recent drug use    Opioid Risk Score: 11    I have reviewed the medical records, the Prescription Monitoring Program and I have determined that controlled substance treatment is medically indicated.

## 2021-01-12 NOTE — ASSESSMENT & PLAN NOTE
"Tried depakote but didn't help as she had 2 \"episodes\"  Took it for 2-3 weeks   She reports that \"It made me feel funny\"    "

## 2021-01-12 NOTE — PATIENT INSTRUCTIONS
Gynecology - for IUD replacement  [facility]  ?Dr. Brooke Freeman Dr. ?Hilary Delacruz  [address] [place] [address]. [address]  Phone: [phone]    Gastroenterology - for colon cancer screening   [facility] - [address]  [address]  Phone:  [phone]    Orthopedics - wait for a phone call

## 2021-01-12 NOTE — PROGRESS NOTES
"Subjective:     CC: f/u, L hip pain    HPI:     Bipolar 1 disorder (HCC)  Tried depakote but didn't help as she had 2 \"episodes\"  Took it for 2-3 weeks       Left hip pain  L hip pain is ongoing. Ibuprofen is not helping much. Tylenol doesn't help. Affecting her ability to work  Analgesia:  Activity Level:  Adverse Effects:  Aberrant Behavior: none  Affect and Mood: normal    History of pain:  PHQ9:  Prior imaging: xrays L hip  Prior treatments: NSAIds, tylenol, cannot take muscle relaxants  Response to prior treatments: minimal    Any CS last dose: none  Last dose of currently prescribed meds: n/a  Reports no recent drug use    Opioid Risk Score: 11    I have reviewed the medical records, the Prescription Monitoring Program and I have determined that controlled substance treatment is medically indicated.                         Past Medical History:   Diagnosis Date   • Allergy     Abx that starts w/\"D\"   • Compression fracture of body of thoracic vertebra (HCC)    • Depression Dx 18 yo    no meds   • Head injury        Social History     Tobacco Use   • Smoking status: Current Every Day Smoker     Packs/day: 0.50     Years: 15.00     Pack years: 7.50     Types: Cigarettes   • Smokeless tobacco: Never Used   • Tobacco comment: now 1/4 pack   Substance Use Topics   • Alcohol use: Not Currently     Alcohol/week: 0.0 oz     Frequency: Monthly or less     Drinks per session: 1 or 2     Comment: stopped 6 months ago, was in [email or web address] program   • Drug use: Not Currently     Comment: smoked cocaine 3-4 years ago       Current Outpatient Medications Ordered in Epic   Medication Sig Dispense Refill   • divalproex (DEPAKOTE) 250 MG Tablet Delayed Response Take 1 tab two times daily. After 3 days increase to 2 tabs twice a day 120 Tab 1   • ibuprofen (MOTRIN) 600 MG Tab Take 1 Tab by mouth every 8 hours as needed. 30 Tab 0   • ACETAMINOPHEN EXTRA STRENGTH 500 MG Tab TAKE 1 2 TABLETS BY MOUTH EVERY 6 HOURS AS NEEDED FOR PAIN " "OR FEVER (MAX 6 TABS IN 24 HOURS)       No current Epic-ordered facility-administered medications on file.        Allergies:  Doxycycline and Nkda [no known drug allergy]    Health Maintenance:  Pap at next visit     ROS:  MSk: L hip pain  Psych: labile mood    Objective:       Exam:  /68 (BP Location: Left arm, Patient Position: Sitting, BP Cuff Size: Adult)   Pulse (!) 113   Temp 37 °C (98.6 °F) (Temporal)   Resp 16   Ht 1.549 m (5' 1\")   Wt 81.5 kg (179 lb 11.2 oz)   SpO2 96%   BMI 33.95 kg/m²  Body mass index is 33.95 kg/m².    Gen: No apparent distress.  Neck: Neck is supple   Lungs: Normal effort, CTA bilaterally, no wheezes, rhonchi, or rales  CV: Regular rate and rhythm. No murmurs, rubs, or gallops. No lower extremity edema  Abd: NABS, soft, nontender, nondistended  MSK: Fingers without clubbing or cyanosis. Antalgic gait  Derm: Warm and dry. No visible rashes  Psy: Alert and oriented, Normal mood and affect. Judgment normal      Labs:   Labs normal at recent check 1/11/2021    Assessment & Plan:     40 y.o. female with the following -     1. Left hip pain  - REFERRAL TO ORTHOPEDICS  - ibuprofen (MOTRIN) 600 MG Tab; Take 1 Tab by mouth every 8 hours as needed.  Dispense: 30 Tab; Refill: 0  Patient given referral to orthopedics, hope for a hip corticosteroid injection.  I was going to give her tramadol to tie her over until at that appointment, however she had to leave prior to her urine drug screen being done.  She will make an MA appointment next week, perform urine drug screen, perform opiate consent, will then send over tramadol prescription for 2 weeks.  For now she will take ibuprofen 600s.    2. Bipolar 1 disorder (HCC)  - divalproex (DEPAKOTE) 250 MG Tablet Delayed Response; Take 1 tab two times daily. After 3 days increase to 2 tabs twice a day  Dispense: 120 Tab; Refill: 1  Patient reported that 250 twice a day did not help her at all.  I will restart her back on the 250 twice a day to " titrate up to 500 mg twice a day.  I have also given her the [facility] mental health card so that she can make an appointment with a psychiatrist and therapist as soon as possible.    Return in about 2 weeks (around 1/26/2021) for telephone visit for med f/u.    Pap at next visit  Patient reminded of referrals already ordered, gynecology and gastroenterology.  Flu, pneumo, Tdap and next visit.  Patient reminded to schedule her mammogram    Please note that this dictation was created using voice recognition software. I have made every reasonable attempt to correct obvious errors, but I expect that there are errors of grammar and possibly content that I did not discover before finalizing the note.

## 2021-01-13 RX ORDER — CEFTRIAXONE 1 G/1
1000 INJECTION, POWDER, FOR SOLUTION INTRAMUSCULAR; INTRAVENOUS ONCE
Status: SHIPPED | OUTPATIENT
Start: 2021-01-13 | End: 2021-01-14

## 2021-01-13 NOTE — RESULT ENCOUNTER NOTE
Please call pt and let her know that she tested positive for gonorrhea on urine test done 2 days ago.  She needs to come in for a IM injection of 1g ceftriaxone, based on most recent CDC guidelines. Please advise her to remain abstinent from sexual activity until she can get treated and 7 days treatment. Her partner should also be treated - they should contact their PCP or I can send over some pills - please let me know what they prefer.  1g IM ceftriaxone ordered and pended.

## 2021-01-19 ENCOUNTER — HOSPITAL ENCOUNTER (OUTPATIENT)
Facility: MEDICAL CENTER | Age: 41
End: 2021-01-19
Attending: FAMILY MEDICINE
Payer: COMMERCIAL

## 2021-01-19 ENCOUNTER — NON-PROVIDER VISIT (OUTPATIENT)
Dept: MEDICAL GROUP | Facility: MEDICAL CENTER | Age: 41
End: 2021-01-19
Attending: FAMILY MEDICINE
Payer: COMMERCIAL

## 2021-01-19 PROCEDURE — 700111 HCHG RX REV CODE 636 W/ 250 OVERRIDE (IP): Performed by: FAMILY MEDICINE

## 2021-01-19 PROCEDURE — 80307 DRUG TEST PRSMV CHEM ANLYZR: CPT

## 2021-01-19 PROCEDURE — G0480 DRUG TEST DEF 1-7 CLASSES: HCPCS

## 2021-01-19 PROCEDURE — 96372 THER/PROPH/DIAG INJ SC/IM: CPT | Performed by: FAMILY MEDICINE

## 2021-01-19 RX ORDER — CEFTRIAXONE 1 G/1
1000 INJECTION, POWDER, FOR SOLUTION INTRAMUSCULAR; INTRAVENOUS ONCE
Status: COMPLETED | OUTPATIENT
Start: 2021-01-19 | End: 2021-01-19

## 2021-01-19 RX ADMIN — CEFTRIAXONE SODIUM 1000 MG: 1 INJECTION, POWDER, FOR SOLUTION INTRAMUSCULAR; INTRAVENOUS at 16:53

## 2021-01-20 NOTE — NON-PROVIDER
Julia Choi is a 40 y.o. female here for a non-provider visit for ceftrixaone 1000mg  injection.    Reason for injection: STD  Order in MAR?: Yes  Patient supplied?:No  Minimum interval has been met for this injection (per MAR order): Yes    Order and dose verified by: SKYLAR   Patient tolerated injection and no adverse effects were observed or reported: No    # of Administrations remaining in MAR: 0  Pt presented to [facility] for ceftriaxone 1000mg ordered by Dr Emery, Pt was placed in room and advised of potential reactions, Pt was also advised if she has any reaction later in the day to report to urgent care or to ER. Pt stated she understood.   No further

## 2021-01-21 LAB
AMPHET CTO UR CFM-MCNC: NEGATIVE NG/ML
BARBITURATES CTO UR CFM-MCNC: NEGATIVE NG/ML
BENZODIAZ CTO UR CFM-MCNC: NEGATIVE NG/ML
CANNABINOIDS CTO UR CFM-MCNC: NEGATIVE NG/ML
COCAINE CTO UR CFM-MCNC: POSITIVE NG/ML
DRUG COMMENT 753798: NORMAL
METHADONE CTO UR CFM-MCNC: NEGATIVE NG/ML
OPIATES CTO UR CFM-MCNC: NEGATIVE NG/ML
PCP CTO UR CFM-MCNC: NEGATIVE NG/ML
PROPOXYPH CTO UR CFM-MCNC: NEGATIVE NG/ML

## 2021-01-22 DIAGNOSIS — M25.552 LEFT HIP PAIN: ICD-10-CM

## 2021-01-23 LAB — BZE UR-MCNC: 656 NG/ML

## 2023-12-01 NOTE — PROGRESS NOTES
Subjective:      Julia Choi is a 38 y.o. female who presents with Back Pain            Patient 38-year-old female here to reestablish with the clinic today.  She had been seeing another primary care provider in a different clinic but decided to return to this clinic.    She had recently been seen in the emergency room for an upper respiratory infection and following an x-ray examination she was told that she has a T8 fracture in her thoracic spine.  She thinks this may be the cause of all of her back and upper body pain that she has been suffering from.  She states that an MRI has been ordered for her thoracic spine but in the meantime will order a follow-up thoracic and lumbar x-ray to further assess her lumbar and thoracic spine.  She has also been referred to pain management as well as neurosurgery since it is unknown when the fracture actually occurred.  She will also be referred to physical therapy for further assistance in management of her chronic pain.  She will be given Mobic at 7.5-15 mg a daily as well as Neurontin at 100 mg 3 times daily.  We will continue to follow.    She also states that she had been diagnosed with bipolar disorder in the past.  She is currently not on any medications and not seeing any psychiatrist or counselor for her bipolar disorder.  We will have her referred back to psychiatry as well as psychology for further management of her bipolar disorder.  She is not having any urges to harm himself or others.  If she has any sudden worsening of her current symptoms we will have her go to the emergency room for further assistance in management.    Patient is a smoker and has frequent bronchitis problems.  She recently had been seen in the emergency room for the bronchitis.  Once she is improved will order a pulmonary function test to see what her baseline is.  The meantime will have her continue using her inhaler as directed.  She is currently not running any fevers and her cough  "is not productive.  We will continue to follow.    Her past surgical history includes abdominal and GYN surgeries.    Her family medical history is significant for hypertension, colon cancer and substance use problems.    She smokes about a third a pack of cigarettes a day, was recently at the [employer] program for alcohol abuse and has a history of cocaine use.        Review of Systems   Constitutional: Negative for chills and fever.   HENT: Negative for hearing loss and tinnitus.    Eyes: Negative.    Respiratory: Negative for cough, sputum production and shortness of breath.    Cardiovascular: Negative for chest pain and palpitations.   Gastrointestinal: Negative for abdominal pain, nausea and vomiting.   Genitourinary: Negative.    Musculoskeletal: Positive for back pain and joint pain. Negative for neck pain.   Skin: Negative for rash.   Neurological: Positive for tingling and focal weakness. Negative for tremors, sensory change, speech change, seizures and headaches.   Psychiatric/Behavioral: Positive for depression and substance abuse. Negative for hallucinations and suicidal ideas. The patient is nervous/anxious. The patient does not have insomnia.           Objective:     /80 (BP Location: Left arm, Patient Position: Standing)   Pulse 98   Temp 36.6 °C (97.8 °F)   Resp 14   Ht 1.549 m (5' 1\")   Wt 78 kg (172 lb)   SpO2 96%   BMI 32.50 kg/m²      Physical Exam   Constitutional: She is oriented to person, place, and time.   BMI 32   HENT:   Head: Normocephalic and atraumatic.   Eyes: Pupils are equal, round, and reactive to light. Conjunctivae and EOM are normal.   Neck: Normal range of motion. Neck supple. No thyromegaly present.   Cardiovascular: Normal rate, regular rhythm and normal heart sounds.  Exam reveals no friction rub.    No murmur heard.  Pulmonary/Chest: Effort normal and breath sounds normal. No respiratory distress. She has no wheezes. She has no rales.   Abdominal: Soft. Bowel " sounds are normal. She exhibits no distension. There is no tenderness.   Musculoskeletal: She exhibits tenderness. She exhibits no edema.   Left shoulder pain with some tenderness over the area of her fracture.   Lymphadenopathy:     She has no cervical adenopathy.   Neurological: She is alert and oriented to person, place, and time.   Skin: Skin is warm and dry.   Psychiatric: She has a normal mood and affect. Her behavior is normal.   Nursing note and vitals reviewed.              Assessment/Plan:     1. BMI 32.0-32.9,adult  We will have her watch her diet as well as exercise as tolerated to help lower her BMI.  - Patient identified as having weight management issue.  Appropriate orders and counseling given.  - gabapentin (NEURONTIN) 100 MG Cap; Take 1 Cap by mouth 3 times a day.  Dispense: 90 Cap; Refill: 3    2. Closed compression fracture of thoracic vertebra, initial encounter ([facility])  An x-ray of her lumbar and thoracic spine will be reordered, she will also be given medications starting with Neurontin at 100 mg 3 times daily and Mobic at 7.5-15 mg daily.  She will also be referred to physical therapy, pain management and neurosurgery for a further evaluation of her thoracic compression fracture.  We will continue to follow.  - DX-LUMBAR SPINE-2 OR 3 VIEWS; Future  - DX-THORACIC SPINE-WITH SWIMMERS VIEW; Future  - REFERRAL TO PAIN CLINIC  - REFERRAL TO PHYSICAL THERAPY Reason for Therapy: Eval/Treat/Report  - REFERRAL TO NEUROSURGERY    3. Midline thoracic back pain, unspecified chronicity  See above plan.  - REFERRAL TO PAIN CLINIC  - REFERRAL TO PHYSICAL THERAPY Reason for Therapy: Eval/Treat/Report  - REFERRAL TO NEUROSURGERY    4. Bipolar 1 disorder ([facility])  She will be referred to psychiatry for further evaluation of her bipolar disorder.  She states that she had been treated in the past and had been seeing a counselor as well as a psychiatrist but is currently not seeing one.  We will continue to follow.  -  REFERRAL TO PSYCHIATRY  - REFERRAL TO PSYCHOLOGY    5. Mild intermittent asthma without complication  We will have her hold onto inhaler to use as needed.  Discussed getting a pulmonary function test once her bronchitis improves.  We will continue to follow.    6. Smoker  We will have patient try nicotine patches as well as attend smoking cessation support groups and programs in the community.  We will continue to follow.       [employer]

## 2025-04-28 ENCOUNTER — APPOINTMENT (OUTPATIENT)
Dept: RADIOLOGY | Facility: MEDICAL CENTER | Age: 45
End: 2025-04-28
Attending: STUDENT IN AN ORGANIZED HEALTH CARE EDUCATION/TRAINING PROGRAM
Payer: COMMERCIAL

## 2025-04-28 LAB
ALBUMIN SERPL BCP-MCNC: 4.1 G/DL (ref 3.2–4.9)
ALBUMIN/GLOB SERPL: 1.3 G/DL
ALP SERPL-CCNC: 76 U/L (ref 30–99)
ALT SERPL-CCNC: 18 U/L (ref 2–50)
ANION GAP SERPL CALC-SCNC: 10 MMOL/L (ref 7–16)
AST SERPL-CCNC: 28 U/L (ref 12–45)
B-HCG SERPL-ACNC: 2 MIU/ML (ref 0–5)
BASOPHILS # BLD AUTO: 0.4 % (ref 0–1.8)
BASOPHILS # BLD: 0.02 K/UL (ref 0–0.12)
BILIRUB SERPL-MCNC: 0.2 MG/DL (ref 0.1–1.5)
BUN SERPL-MCNC: 14 MG/DL (ref 8–22)
CALCIUM ALBUM COR SERPL-MCNC: 8.4 MG/DL (ref 8.5–10.5)
CALCIUM SERPL-MCNC: 8.5 MG/DL (ref 8.5–10.5)
CHLORIDE SERPL-SCNC: 104 MMOL/L (ref 96–112)
CO2 SERPL-SCNC: 23 MMOL/L (ref 20–33)
CREAT SERPL-MCNC: 0.82 MG/DL (ref 0.5–1.4)
EOSINOPHIL # BLD AUTO: 0.05 K/UL (ref 0–0.51)
EOSINOPHIL NFR BLD: 1 % (ref 0–6.9)
ERYTHROCYTE [DISTWIDTH] IN BLOOD BY AUTOMATED COUNT: 42.2 FL (ref 35.9–50)
GFR SERPLBLD CREATININE-BSD FMLA CKD-EPI: 90 ML/MIN/1.73 M 2
GLOBULIN SER CALC-MCNC: 3.2 G/DL (ref 1.9–3.5)
GLUCOSE SERPL-MCNC: 114 MG/DL (ref 65–99)
HCT VFR BLD AUTO: 46.1 % (ref 37–47)
HGB BLD-MCNC: 15.4 G/DL (ref 12–16)
IMM GRANULOCYTES # BLD AUTO: 0.01 K/UL (ref 0–0.11)
IMM GRANULOCYTES NFR BLD AUTO: 0.2 % (ref 0–0.9)
LYMPHOCYTES # BLD AUTO: 2.31 K/UL (ref 1–4.8)
LYMPHOCYTES NFR BLD: 44.9 % (ref 22–41)
MCH RBC QN AUTO: 28.4 PG (ref 27–33)
MCHC RBC AUTO-ENTMCNC: 33.4 G/DL (ref 32.2–35.5)
MCV RBC AUTO: 84.9 FL (ref 81.4–97.8)
MONOCYTES # BLD AUTO: 0.45 K/UL (ref 0–0.85)
MONOCYTES NFR BLD AUTO: 8.7 % (ref 0–13.4)
NEUTROPHILS # BLD AUTO: 2.31 K/UL (ref 1.82–7.42)
NEUTROPHILS NFR BLD: 44.8 % (ref 44–72)
NRBC # BLD AUTO: 0 K/UL
NRBC BLD-RTO: 0 /100 WBC (ref 0–0.2)
NUMBER OF RH DOSES IND 8505RD: NORMAL
PLATELET # BLD AUTO: 296 K/UL (ref 164–446)
PMV BLD AUTO: 9.2 FL (ref 9–12.9)
POTASSIUM SERPL-SCNC: 4.1 MMOL/L (ref 3.6–5.5)
PROT SERPL-MCNC: 7.3 G/DL (ref 6–8.2)
RBC # BLD AUTO: 5.43 M/UL (ref 4.2–5.4)
RH BLD: NORMAL
SODIUM SERPL-SCNC: 137 MMOL/L (ref 135–145)
WBC # BLD AUTO: 5.2 K/UL (ref 4.8–10.8)

## 2025-04-28 PROCEDURE — 80053 COMPREHEN METABOLIC PANEL: CPT

## 2025-04-28 PROCEDURE — 85025 COMPLETE CBC W/AUTO DIFF WBC: CPT

## 2025-04-28 PROCEDURE — 84702 CHORIONIC GONADOTROPIN TEST: CPT

## 2025-04-28 PROCEDURE — 36415 COLL VENOUS BLD VENIPUNCTURE: CPT

## 2025-04-28 PROCEDURE — 99284 EMERGENCY DEPT VISIT MOD MDM: CPT

## 2025-04-28 PROCEDURE — 86901 BLOOD TYPING SEROLOGIC RH(D): CPT

## 2025-04-28 PROCEDURE — 76817 TRANSVAGINAL US OBSTETRIC: CPT

## 2025-04-29 ENCOUNTER — HOSPITAL ENCOUNTER (EMERGENCY)
Facility: MEDICAL CENTER | Age: 45
End: 2025-04-29
Attending: STUDENT IN AN ORGANIZED HEALTH CARE EDUCATION/TRAINING PROGRAM
Payer: COMMERCIAL

## 2025-04-29 VITALS
DIASTOLIC BLOOD PRESSURE: 75 MMHG | TEMPERATURE: 97.6 F | RESPIRATION RATE: 16 BRPM | SYSTOLIC BLOOD PRESSURE: 126 MMHG | BODY MASS INDEX: 33.23 KG/M2 | HEIGHT: 61 IN | OXYGEN SATURATION: 95 % | HEART RATE: 72 BPM | WEIGHT: 176 LBS

## 2025-04-29 DIAGNOSIS — O36.80X0 PREGNANCY OF UNKNOWN ANATOMIC LOCATION: ICD-10-CM

## 2025-04-29 DIAGNOSIS — R73.9 HYPERGLYCEMIA: ICD-10-CM

## 2025-04-29 LAB
APPEARANCE UR: ABNORMAL
BACTERIA #/AREA URNS HPF: ABNORMAL /HPF
BILIRUB UR QL STRIP.AUTO: NEGATIVE
CASTS URNS QL MICRO: ABNORMAL /LPF (ref 0–2)
COLOR UR: ABNORMAL
EPITHELIAL CELLS 1715: ABNORMAL /HPF (ref 0–5)
GLUCOSE UR STRIP.AUTO-MCNC: NEGATIVE MG/DL
KETONES UR STRIP.AUTO-MCNC: ABNORMAL MG/DL
LEUKOCYTE ESTERASE UR QL STRIP.AUTO: NEGATIVE
MICRO URNS: ABNORMAL
NITRITE UR QL STRIP.AUTO: NEGATIVE
PH UR STRIP.AUTO: 7 [PH] (ref 5–8)
PROT UR QL STRIP: 100 MG/DL
RBC # URNS HPF: >100 /HPF (ref 0–2)
RBC UR QL AUTO: ABNORMAL
SP GR UR STRIP.AUTO: 1.02
UROBILINOGEN UR STRIP.AUTO-MCNC: 0.2 EU/DL
WBC #/AREA URNS HPF: ABNORMAL /HPF

## 2025-04-29 PROCEDURE — 81001 URINALYSIS AUTO W/SCOPE: CPT

## 2025-04-29 NOTE — DISCHARGE INSTRUCTIONS
Please come back at 48 hours for a pregnancy recheck.  As we discussed your pregnancy hormone is far too low to be certain of what is occurring at this time.  That hormone will increase at a faster rate over the next 48 hours which will give us more information.  Until we can reasonably identify a normal pregnancy there is concern of ectopic pregnancy or miscarriage.    Should you have bleeding more than 1 pad per hour for 4 consecutive hours please return sooner than that 48-hour mary jane for evaluation.

## 2025-04-29 NOTE — ED TRIAGE NOTES
Julia Choi  45 y.o. female  Chief Complaint   Patient presents with    Abdominal Pain     BIB [facility] for lower abdominal pain with vaginal bleed for 2 days.   Reports IUD in placed but tested positive for pregnancy. . Received zofran 4 mg.      Pt ambulatory to triage with steady gait for above complaint.     Pt is GCS 15, speaking in full sentences, follows commands and responds appropriately to questions. Resp are even and unlabored.     protocol ordered. Pt placed in phlebotomy. Pt educated on triage process. Pt encouraged to alert staff for any changes.       Vitals:    25 2202   BP: (!) 134/93   Pulse: 84   Resp: 16   Temp: 36.9 °C (98.5 °F)   SpO2: 96%

## 2025-04-29 NOTE — ED NOTES
Pt given discharge instructions/ home care instructions explained, pt verbalized understanding of instructions given/pt understands the importance of follow up, pt ambulatory to ER giovanni.

## 2025-07-02 ENCOUNTER — HOSPITAL ENCOUNTER (EMERGENCY)
Facility: MEDICAL CENTER | Age: 45
End: 2025-07-02
Attending: EMERGENCY MEDICINE
Payer: OTHER MISCELLANEOUS

## 2025-07-02 ENCOUNTER — APPOINTMENT (OUTPATIENT)
Dept: RADIOLOGY | Facility: MEDICAL CENTER | Age: 45
End: 2025-07-02
Attending: EMERGENCY MEDICINE
Payer: OTHER MISCELLANEOUS

## 2025-07-02 ASSESSMENT — FIBROSIS 4 INDEX: FIB4 SCORE: 1

## 2025-07-02 ASSESSMENT — PAIN DESCRIPTION - PAIN TYPE: TYPE: ACUTE PAIN
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
## 2025-07-02 NOTE — DISCHARGE INSTRUCTIONS
Use crutches and splint if it is painful to walk.  Follow-up with orthopedic doctor in 10 to 14 days if not better.
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
## 2025-07-21 ENCOUNTER — OFFICE VISIT (OUTPATIENT)
Dept: URGENT CARE | Facility: CLINIC | Age: 45
End: 2025-07-21
Payer: COMMERCIAL

## 2025-07-21 VITALS
DIASTOLIC BLOOD PRESSURE: 100 MMHG | WEIGHT: 176 LBS | RESPIRATION RATE: 18 BRPM | OXYGEN SATURATION: 96 % | TEMPERATURE: 96.7 F | HEART RATE: 98 BPM | SYSTOLIC BLOOD PRESSURE: 140 MMHG | BODY MASS INDEX: 33.23 KG/M2 | HEIGHT: 61 IN

## 2025-07-21 DIAGNOSIS — K04.7 DENTAL ABSCESS: ICD-10-CM

## 2025-07-21 DIAGNOSIS — M25.362 KNEE INSTABILITY, LEFT: ICD-10-CM

## 2025-07-21 PROCEDURE — 3077F SYST BP >= 140 MM HG: CPT | Performed by: PHYSICIAN ASSISTANT

## 2025-07-21 PROCEDURE — 99203 OFFICE O/P NEW LOW 30 MIN: CPT | Performed by: PHYSICIAN ASSISTANT

## 2025-07-21 PROCEDURE — 3080F DIAST BP >= 90 MM HG: CPT | Performed by: PHYSICIAN ASSISTANT

## 2025-07-21 ASSESSMENT — ENCOUNTER SYMPTOMS
SINUS PRESSURE: 0
FEVER: 0
NEUROLOGICAL NEGATIVE: 1
CONSTITUTIONAL NEGATIVE: 1
GASTROINTESTINAL NEGATIVE: 1

## 2025-07-21 ASSESSMENT — FIBROSIS 4 INDEX: FIB4 SCORE: 1
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
## 2025-08-18 ENCOUNTER — APPOINTMENT (OUTPATIENT)
Dept: MEDICAL GROUP | Age: 45
End: 2025-08-18
Payer: COMMERCIAL